# Patient Record
Sex: FEMALE | Race: WHITE
[De-identification: names, ages, dates, MRNs, and addresses within clinical notes are randomized per-mention and may not be internally consistent; named-entity substitution may affect disease eponyms.]

---

## 2019-11-18 ENCOUNTER — HOSPITAL ENCOUNTER (OUTPATIENT)
Dept: HOSPITAL 47 - LABWHC1 | Age: 64
Discharge: HOME | End: 2019-11-18
Attending: INTERNAL MEDICINE
Payer: COMMERCIAL

## 2019-11-18 DIAGNOSIS — E03.8: Primary | ICD-10-CM

## 2019-11-18 PROCEDURE — 36415 COLL VENOUS BLD VENIPUNCTURE: CPT

## 2019-11-18 PROCEDURE — 84443 ASSAY THYROID STIM HORMONE: CPT

## 2020-02-15 ENCOUNTER — HOSPITAL ENCOUNTER (EMERGENCY)
Dept: HOSPITAL 47 - EC | Age: 65
Discharge: HOME | End: 2020-02-15
Payer: COMMERCIAL

## 2020-02-15 VITALS — HEART RATE: 74 BPM | TEMPERATURE: 97.9 F | SYSTOLIC BLOOD PRESSURE: 122 MMHG | DIASTOLIC BLOOD PRESSURE: 74 MMHG

## 2020-02-15 VITALS — RESPIRATION RATE: 20 BRPM

## 2020-02-15 DIAGNOSIS — Z88.0: ICD-10-CM

## 2020-02-15 DIAGNOSIS — Z79.890: ICD-10-CM

## 2020-02-15 DIAGNOSIS — Z79.82: ICD-10-CM

## 2020-02-15 DIAGNOSIS — K59.00: Primary | ICD-10-CM

## 2020-02-15 DIAGNOSIS — Z87.891: ICD-10-CM

## 2020-02-15 DIAGNOSIS — E07.9: ICD-10-CM

## 2020-02-15 PROCEDURE — 99284 EMERGENCY DEPT VISIT MOD MDM: CPT

## 2020-02-15 PROCEDURE — 74018 RADEX ABDOMEN 1 VIEW: CPT

## 2020-02-15 NOTE — ED
Abdominal Pain HPI





- General


Chief Complaint: Abdominal Pain


Stated Complaint: Constipation


Time Seen by Provider: 02/15/20 09:49


Source: patient, RN notes reviewed


Mode of arrival: ambulatory


Limitations: no limitations





- History of Present Illness


Initial Comments: 


64-year-old female presents emergency Department with chief complaint of rectal 

pain.  Patient states that she's not had about 13 days states that she feels 

constipated states that she wiped after trying to have a bone states there was 

some stool there.  Denies any bleeding.  Patient states she had a colonoscopy a 

few years ago which showed some polyps but did not come back for another 10 

years.  She states she is concerned because her sister was recently diagnosed 

with colon cancer.  Patient denies fevers chills, nausea vomiting dysuria 

hematuria.  Patient states pain is on her rectum.








- Related Data


                                Home Medications











 Medication  Instructions  Recorded  Confirmed


 


Aspirin [Adult Low Dose Aspirin EC] 81 mg PO DAILY 07/14/16 07/19/16


 


Levothyroxine (Unknown Dose) 1 dose PO DAILY 07/14/16 07/19/16


 


Vitamin E   1 tab PO HS 07/14/16 07/19/16











                                    Allergies











Allergy/AdvReac Type Severity Reaction Status Date / Time


 


Penicillins Allergy Unknown Unknown Verified 07/14/16 14:06





   Childhood  














Review of Systems


ROS Statement: 


Those systems with pertinent positive or pertinent negative responses have been 

documented in the HPI.





ROS Other: All systems not noted in ROS Statement are negative.





Past Medical History


Past Medical History: Thyroid Disorder


Additional Past Medical History / Comment(s): LOW THYROID.


History of Any Multi-Drug Resistant Organisms: None Reported


Past Surgical History: Appendectomy, Tonsillectomy


Past Anesthesia/Blood Transfusion Reactions: No Reported Reaction


Past Psychological History: No Psychological Hx Reported


Smoking Status: Former smoker


Past Alcohol Use History: Occasional


Past Drug Use History: None Reported





- Past Family History


  ** Mother


Family Medical History: Cancer





General Exam


Limitations: no limitations


General appearance: alert, in no apparent distress


Head exam: Present: atraumatic, normocephalic, normal inspection


Eye exam: Present: normal appearance, PERRL, EOMI.  Absent: scleral icterus, 

conjunctival injection, periorbital swelling


Respiratory exam: Present: normal lung sounds bilaterally.  Absent: respiratory 

distress, wheezes, rales, rhonchi, stridor


Cardiovascular Exam: Present: regular rate, normal rhythm, normal heart sounds. 

Absent: systolic murmur, diastolic murmur, rubs, gallop, clicks


GI/Abdominal exam: Present: soft, normal bowel sounds.  Absent: distended, 

tenderness, guarding, rebound, rigid


Back exam: Absent: CVA tenderness (R), CVA tenderness (L)


Neurological exam: Present: alert


Skin exam: Present: warm, dry, intact, normal color.  Absent: rash





Course


                                   Vital Signs











  02/15/20 02/15/20





  09:41 09:44


 


Temperature 97.9 F 


 


Pulse Rate 74 


 


Respiratory 18 20





Rate  


 


Blood Pressure 122/74 


 


O2 Sat by Pulse 100 





Oximetry  














Medical Decision Making





- Medical Decision Making





64-year-old male presented for abdominal pain and rectal pain.  Patient had x-

ray ordered she states that before the x-ray she had a large bowel movement 

which alleviated all of her symptoms.  I did examine her rectum which showed 

small fissure.  Patient will use magnesium citrate for remaining constipation 

return for any worsening symptoms.





Disposition


Clinical Impression: 


 Constipation





Disposition: HOME SELF-CARE


Condition: Stable


Instructions (If sedation given, give patient instructions):  Constipation (DC),

High Fiber Diet (ED)


Additional Instructions: 


Please return to the Emergency Department if symptoms worsen or any other 

concerns.


Is patient prescribed a controlled substance at d/c from ED?: No


Referrals: 


Levi Chavez MD [Primary Care Provider] - 1-2 days


Time of Disposition: 10:32

## 2020-02-15 NOTE — XR
EXAMINATION TYPE: XR KUB , ONE VIEW

 

DATE OF EXAM ORDERED: 2/15/2020

 

HISTORY: Rectal pain, constipation.

 

COMPARISON: None.

 

FINDINGS:  The lung bases are clear.

 

Within the abdomen, the abdominal gas pattern is normal. There is no evidence of obstruction or free 
air. No unusual calcifications are seen. There is a mild levoscoliosis.

 

IMPRESSION: 

 

NO ACUTE INTRA-ABDOMINAL ABNORMALITY.

## 2020-10-30 ENCOUNTER — HOSPITAL ENCOUNTER (OUTPATIENT)
Dept: HOSPITAL 47 - ORWHC2ENDO | Age: 65
Discharge: HOME | End: 2020-10-30
Attending: INTERNAL MEDICINE
Payer: MEDICARE

## 2020-10-30 VITALS — TEMPERATURE: 97 F | RESPIRATION RATE: 16 BRPM

## 2020-10-30 VITALS — SYSTOLIC BLOOD PRESSURE: 105 MMHG | DIASTOLIC BLOOD PRESSURE: 57 MMHG | HEART RATE: 70 BPM

## 2020-10-30 VITALS — BODY MASS INDEX: 24.4 KG/M2

## 2020-10-30 DIAGNOSIS — Z79.899: ICD-10-CM

## 2020-10-30 DIAGNOSIS — Z87.891: ICD-10-CM

## 2020-10-30 DIAGNOSIS — R19.4: Primary | ICD-10-CM

## 2020-10-30 DIAGNOSIS — Z88.0: ICD-10-CM

## 2020-10-30 DIAGNOSIS — Z80.0: ICD-10-CM

## 2020-10-30 DIAGNOSIS — Z79.890: ICD-10-CM

## 2020-10-30 PROCEDURE — 45378 DIAGNOSTIC COLONOSCOPY: CPT

## 2020-10-30 NOTE — P.PCN
Date of Procedure: 10/30/20


Procedure(s) Performed: 


BRIEF HISTORY: Patient is a 65-year-old pleasant white female scheduled for an 

elective colonoscopy as a part of change in bowel habits.





PROCEDURE PERFORMED: Colonoscopy. 





PREOPERATIVE DIAGNOSIS: Change in bowel habits. 





IV sedation per Anesthesia. 





PROCEDURE: After informed consent was obtained, the patient, was brought into 

the endoscopy unit. IV sedation was administered by Anesthesia under continuous 

monitoring.  Digital rectal examination was normal. Initially the Olympus CF-160

flexible video colonoscope was then inserted in the rectum, gradually advanced 

into the cecum without any difficulty. Careful examination was performed as the 

scope was gradually being withdrawn. Ileocecal valve and the appendiceal orifice

were visualized and appeared normal.  Prep was excellent. Mucosa of the cecum, 

ascending colon, transverse colon, descending colon, sigmoid colon, and rectum 

appeared normal. Retroflexion was performed in the rectum and no lesions were 

seen. The patient tolerated the procedure well. 





IMPRESSION: Normal-appearing colon from rectum to cecum with no evidence of 

colorectal neoplasia.





RECOMMENDATIONS:  Findings of this examination were discussed with the patient 

well as her family.  She was advised to have a repeat screening colonoscopy in 

10 years.